# Patient Record
Sex: FEMALE | Race: BLACK OR AFRICAN AMERICAN
[De-identification: names, ages, dates, MRNs, and addresses within clinical notes are randomized per-mention and may not be internally consistent; named-entity substitution may affect disease eponyms.]

---

## 2018-04-18 ENCOUNTER — HOSPITAL ENCOUNTER (EMERGENCY)
Dept: HOSPITAL 19 - COL.ER | Age: 43
Discharge: HOME | End: 2018-04-18
Payer: MEDICARE

## 2018-04-18 VITALS — TEMPERATURE: 98 F | SYSTOLIC BLOOD PRESSURE: 118 MMHG | DIASTOLIC BLOOD PRESSURE: 69 MMHG

## 2018-04-18 VITALS — HEART RATE: 90 BPM

## 2018-04-18 VITALS — HEIGHT: 62.01 IN | BODY MASS INDEX: 41.86 KG/M2 | WEIGHT: 230.38 LBS

## 2018-04-18 DIAGNOSIS — Z79.82: ICD-10-CM

## 2018-04-18 DIAGNOSIS — Z98.890: ICD-10-CM

## 2018-04-18 DIAGNOSIS — F17.210: ICD-10-CM

## 2018-04-18 DIAGNOSIS — I25.2: ICD-10-CM

## 2018-04-18 DIAGNOSIS — Z95.5: ICD-10-CM

## 2018-04-18 DIAGNOSIS — Z98.51: ICD-10-CM

## 2018-04-18 DIAGNOSIS — I25.10: ICD-10-CM

## 2018-04-18 DIAGNOSIS — E11.9: ICD-10-CM

## 2018-04-18 DIAGNOSIS — S33.9XXA: Primary | ICD-10-CM

## 2018-04-18 DIAGNOSIS — J44.9: ICD-10-CM

## 2018-04-18 DIAGNOSIS — M46.1: ICD-10-CM

## 2018-04-18 DIAGNOSIS — Z79.02: ICD-10-CM

## 2018-04-18 DIAGNOSIS — I10: ICD-10-CM

## 2018-04-18 DIAGNOSIS — Z79.4: ICD-10-CM
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
## 2018-04-18 LAB
GLUCOSE UR QL STRIP.AUTO: (no result)
PH UR STRIP.AUTO: 5 [PH] (ref 5–8)
RBC # UR: (no result) /HPF
SP GR UR STRIP.AUTO: 1.01 (ref 1–1.03)
SQUAMOUS # URNS: (no result) /HPF
URN COLLECT METHOD CLASS: (no result)

## 2018-04-20 ENCOUNTER — HOSPITAL ENCOUNTER (EMERGENCY)
Dept: HOSPITAL 19 - COL.ER | Age: 43
Discharge: LEFT BEFORE DISCHARGE | End: 2018-04-20
Payer: MEDICARE

## 2018-04-20 VITALS — BODY MASS INDEX: 42.76 KG/M2 | WEIGHT: 250.45 LBS | HEIGHT: 64.02 IN

## 2018-04-20 VITALS — SYSTOLIC BLOOD PRESSURE: 155 MMHG | HEART RATE: 98 BPM | DIASTOLIC BLOOD PRESSURE: 77 MMHG | TEMPERATURE: 98.5 F

## 2018-04-20 DIAGNOSIS — R53.81: Primary | ICD-10-CM

## 2018-04-28 ENCOUNTER — HOSPITAL ENCOUNTER (EMERGENCY)
Dept: HOSPITAL 19 - COL.ER | Age: 43
LOS: 1 days | Discharge: HOME | End: 2018-04-29
Payer: MEDICARE

## 2018-04-28 VITALS — SYSTOLIC BLOOD PRESSURE: 153 MMHG | TEMPERATURE: 98.9 F | DIASTOLIC BLOOD PRESSURE: 80 MMHG

## 2018-04-28 VITALS — HEIGHT: 62.01 IN | BODY MASS INDEX: 41.86 KG/M2 | WEIGHT: 230.38 LBS

## 2018-04-28 DIAGNOSIS — I25.2: ICD-10-CM

## 2018-04-28 DIAGNOSIS — I25.10: ICD-10-CM

## 2018-04-28 DIAGNOSIS — J44.9: ICD-10-CM

## 2018-04-28 DIAGNOSIS — Z79.82: ICD-10-CM

## 2018-04-28 DIAGNOSIS — Z79.4: ICD-10-CM

## 2018-04-28 DIAGNOSIS — I11.0: ICD-10-CM

## 2018-04-28 DIAGNOSIS — E11.9: ICD-10-CM

## 2018-04-28 DIAGNOSIS — I50.9: ICD-10-CM

## 2018-04-28 DIAGNOSIS — R21: Primary | ICD-10-CM

## 2018-04-29 VITALS — HEART RATE: 100 BPM

## 2018-06-16 ENCOUNTER — HOSPITAL ENCOUNTER (EMERGENCY)
Dept: HOSPITAL 19 - COL.ER | Age: 43
Discharge: HOME | End: 2018-06-16
Payer: MEDICARE

## 2018-06-16 VITALS — WEIGHT: 215.39 LBS | HEIGHT: 60.98 IN | BODY MASS INDEX: 40.67 KG/M2

## 2018-06-16 VITALS — SYSTOLIC BLOOD PRESSURE: 131 MMHG | DIASTOLIC BLOOD PRESSURE: 78 MMHG | HEART RATE: 101 BPM

## 2018-06-16 VITALS — TEMPERATURE: 98.1 F

## 2018-06-16 DIAGNOSIS — Z79.82: ICD-10-CM

## 2018-06-16 DIAGNOSIS — I10: ICD-10-CM

## 2018-06-16 DIAGNOSIS — Z79.4: ICD-10-CM

## 2018-06-16 DIAGNOSIS — Z79.02: ICD-10-CM

## 2018-06-16 DIAGNOSIS — E11.21: Primary | ICD-10-CM

## 2018-06-16 DIAGNOSIS — J45.909: ICD-10-CM

## 2018-08-14 ENCOUNTER — HOSPITAL ENCOUNTER (EMERGENCY)
Dept: HOSPITAL 19 - COL.ER | Age: 43
Discharge: HOME | End: 2018-08-14
Payer: MEDICARE

## 2018-08-14 VITALS — TEMPERATURE: 97.3 F

## 2018-08-14 VITALS — HEART RATE: 90 BPM | DIASTOLIC BLOOD PRESSURE: 67 MMHG | SYSTOLIC BLOOD PRESSURE: 144 MMHG

## 2018-08-14 VITALS — WEIGHT: 220.46 LBS | HEIGHT: 62.01 IN | BODY MASS INDEX: 40.06 KG/M2

## 2018-08-14 DIAGNOSIS — E11.40: ICD-10-CM

## 2018-08-14 DIAGNOSIS — I25.2: ICD-10-CM

## 2018-08-14 DIAGNOSIS — Z79.02: ICD-10-CM

## 2018-08-14 DIAGNOSIS — R10.11: Primary | ICD-10-CM

## 2018-08-14 DIAGNOSIS — F17.210: ICD-10-CM

## 2018-08-14 DIAGNOSIS — I10: ICD-10-CM

## 2018-08-14 DIAGNOSIS — J44.9: ICD-10-CM

## 2018-08-14 DIAGNOSIS — Z95.5: ICD-10-CM

## 2018-08-14 DIAGNOSIS — Z79.4: ICD-10-CM

## 2018-08-14 LAB
ALBUMIN SERPL-MCNC: 3.8 GM/DL (ref 3.5–5)
ALP SERPL-CCNC: 42 U/L (ref 50–136)
ALT SERPL-CCNC: 28 U/L (ref 9–52)
ANION GAP SERPL CALC-SCNC: 11 MMOL/L (ref 7–16)
AST SERPL-CCNC: 29 U/L (ref 15–37)
BASOPHILS # BLD: 0 10*3/UL (ref 0–0.2)
BASOPHILS NFR BLD AUTO: 0.2 % (ref 0–2)
BILIRUB SERPL-MCNC: 0.4 MG/DL (ref 0–1)
BUN SERPL-MCNC: 5 MG/DL (ref 7–17)
CALCIUM SERPL-MCNC: 9 MG/DL (ref 8.4–10.2)
CHLORIDE SERPL-SCNC: 101 MMOL/L (ref 98–107)
CO2 SERPL-SCNC: 24 MMOL/L (ref 22–30)
CREAT SERPL-SCNC: 0.57 MG/DL (ref 0.52–1.25)
CRP SERPL-MCNC: 6.8 MG/DL (ref 0–0.9)
EOSINOPHIL # BLD: 0.1 10*3/UL (ref 0–0.7)
EOSINOPHIL NFR BLD: 0.7 % (ref 0–4)
ERYTHROCYTE [DISTWIDTH] IN BLOOD BY AUTOMATED COUNT: 14.7 % (ref 11.5–14.5)
GLUCOSE SERPL-MCNC: 294 MG/DL (ref 74–106)
GLUCOSE UR QL STRIP.AUTO: (no result)
GRANULOCYTES # BLD AUTO: 73.6 % (ref 42.2–75.2)
HCT VFR BLD AUTO: 33.9 % (ref 37–47)
HGB BLD-MCNC: 11.9 G/DL (ref 12.5–16)
LIPASE SERPL-CCNC: 25 U/L (ref 23–300)
LYMPHOCYTES # BLD: 2.3 10*3/UL (ref 1.2–3.4)
LYMPHOCYTES NFR BLD: 15.2 % (ref 20–51)
MCH RBC QN AUTO: 30 PG (ref 27–31)
MCHC RBC AUTO-ENTMCNC: 35 G/DL (ref 33–37)
MCV RBC AUTO: 86 FL (ref 80–100)
MONOCYTES # BLD: 1.5 10*3/UL (ref 0.1–0.6)
MONOCYTES NFR BLD AUTO: 9.9 % (ref 1.7–9.3)
NEUTROPHILS # BLD: 10.9 10*3/UL (ref 1.4–6.5)
PH UR STRIP.AUTO: 6 [PH] (ref 5–8)
PLATELET # BLD AUTO: 294 K/MM3 (ref 130–400)
PMV BLD AUTO: 10.6 FL (ref 7.4–10.4)
POTASSIUM SERPL-SCNC: 3.3 MMOL/L (ref 3.4–5)
PROT SERPL-MCNC: 7.3 GM/DL (ref 6.4–8.2)
RBC # BLD AUTO: 3.93 M/MM3 (ref 4.1–5.3)
RBC # UR: (no result) /HPF
SODIUM SERPL-SCNC: 136 MMOL/L (ref 137–145)
SP GR UR STRIP.AUTO: 1.02 (ref 1–1.03)
SQUAMOUS # URNS: (no result) /HPF
URN COLLECT METHOD CLASS: (no result)

## 2018-10-07 ENCOUNTER — HOSPITAL ENCOUNTER (EMERGENCY)
Dept: HOSPITAL 19 - COL.ER | Age: 43
Discharge: HOME | End: 2018-10-07
Payer: MEDICARE

## 2018-10-07 VITALS — BODY MASS INDEX: 36.41 KG/M2 | HEIGHT: 62.01 IN | WEIGHT: 200.4 LBS

## 2018-10-07 VITALS — TEMPERATURE: 98.1 F

## 2018-10-07 VITALS — HEART RATE: 98 BPM | SYSTOLIC BLOOD PRESSURE: 181 MMHG | DIASTOLIC BLOOD PRESSURE: 98 MMHG

## 2018-10-07 DIAGNOSIS — E11.9: ICD-10-CM

## 2018-10-07 DIAGNOSIS — Z79.4: ICD-10-CM

## 2018-10-07 DIAGNOSIS — I50.9: ICD-10-CM

## 2018-10-07 DIAGNOSIS — Z95.5: ICD-10-CM

## 2018-10-07 DIAGNOSIS — K59.00: Primary | ICD-10-CM

## 2018-10-07 DIAGNOSIS — Z79.82: ICD-10-CM

## 2018-12-26 ENCOUNTER — HOSPITAL ENCOUNTER (EMERGENCY)
Dept: HOSPITAL 19 - COL.ER | Age: 43
Discharge: HOME | End: 2018-12-26
Payer: MEDICARE

## 2018-12-26 VITALS — SYSTOLIC BLOOD PRESSURE: 151 MMHG | HEART RATE: 110 BPM | DIASTOLIC BLOOD PRESSURE: 104 MMHG

## 2018-12-26 VITALS — BODY MASS INDEX: 32.52 KG/M2 | HEIGHT: 64.02 IN | WEIGHT: 190.48 LBS

## 2018-12-26 VITALS — TEMPERATURE: 98 F

## 2018-12-26 DIAGNOSIS — E78.5: ICD-10-CM

## 2018-12-26 DIAGNOSIS — Z79.4: ICD-10-CM

## 2018-12-26 DIAGNOSIS — F17.210: ICD-10-CM

## 2018-12-26 DIAGNOSIS — I10: ICD-10-CM

## 2018-12-26 DIAGNOSIS — J44.9: ICD-10-CM

## 2018-12-26 DIAGNOSIS — E11.42: ICD-10-CM

## 2018-12-26 DIAGNOSIS — H16.002: Primary | ICD-10-CM

## 2018-12-26 DIAGNOSIS — Z79.82: ICD-10-CM

## 2019-03-03 ENCOUNTER — HOSPITAL ENCOUNTER (EMERGENCY)
Dept: HOSPITAL 19 - COL.ER | Age: 44
Discharge: HOME | End: 2019-03-03
Payer: MEDICARE

## 2019-03-03 VITALS — HEIGHT: 62.01 IN | BODY MASS INDEX: 36.41 KG/M2 | WEIGHT: 200.4 LBS

## 2019-03-03 VITALS — DIASTOLIC BLOOD PRESSURE: 121 MMHG | SYSTOLIC BLOOD PRESSURE: 183 MMHG | HEART RATE: 98 BPM

## 2019-03-03 VITALS — TEMPERATURE: 97.5 F

## 2019-03-03 DIAGNOSIS — Z79.4: ICD-10-CM

## 2019-03-03 DIAGNOSIS — F17.210: ICD-10-CM

## 2019-03-03 DIAGNOSIS — Z79.2: ICD-10-CM

## 2019-03-03 DIAGNOSIS — I10: Primary | ICD-10-CM

## 2019-03-03 DIAGNOSIS — R60.0: ICD-10-CM

## 2019-03-03 DIAGNOSIS — Z79.82: ICD-10-CM

## 2019-03-03 DIAGNOSIS — G89.29: ICD-10-CM

## 2019-03-03 DIAGNOSIS — E11.9: ICD-10-CM

## 2019-03-03 DIAGNOSIS — Z95.5: ICD-10-CM

## 2019-03-03 DIAGNOSIS — J44.9: ICD-10-CM

## 2019-03-03 DIAGNOSIS — I25.10: ICD-10-CM

## 2019-03-03 LAB
ALBUMIN SERPL-MCNC: 3.6 GM/DL (ref 3.5–5)
ALP SERPL-CCNC: 81 U/L (ref 50–136)
ALT SERPL-CCNC: 25 U/L (ref 9–52)
ANION GAP SERPL CALC-SCNC: 10 MMOL/L (ref 7–16)
AST SERPL-CCNC: 21 U/L (ref 15–37)
BASOPHILS # BLD: 0.1 10*3/UL (ref 0–0.2)
BASOPHILS NFR BLD AUTO: 0.4 % (ref 0–2)
BILIRUB SERPL-MCNC: 0.6 MG/DL (ref 0–1)
BUN SERPL-MCNC: 16 MG/DL (ref 7–17)
CALCIUM SERPL-MCNC: 9.4 MG/DL (ref 8.4–10.2)
CHLORIDE SERPL-SCNC: 98 MMOL/L (ref 98–107)
CO2 SERPL-SCNC: 29 MMOL/L (ref 22–30)
CREAT SERPL-SCNC: 0.57 MG/DL (ref 0.52–1.25)
EOSINOPHIL # BLD: 0.1 10*3/UL (ref 0–0.7)
EOSINOPHIL NFR BLD: 0.5 % (ref 0–4)
ERYTHROCYTE [DISTWIDTH] IN BLOOD BY AUTOMATED COUNT: 15.7 % (ref 11.5–14.5)
GLUCOSE SERPL-MCNC: 328 MG/DL (ref 74–106)
GRANULOCYTES # BLD AUTO: 67.8 % (ref 42.2–75.2)
HCT VFR BLD AUTO: 34.1 % (ref 37–47)
HGB BLD-MCNC: 11.4 G/DL (ref 12.5–16)
LYMPHOCYTES # BLD: 2.9 10*3/UL (ref 1.2–3.4)
LYMPHOCYTES NFR BLD: 25.3 % (ref 20–51)
MCH RBC QN AUTO: 27 PG (ref 27–31)
MCHC RBC AUTO-ENTMCNC: 33 G/DL (ref 33–37)
MCV RBC AUTO: 80 FL (ref 80–100)
MONOCYTES # BLD: 0.7 10*3/UL (ref 0.1–0.6)
MONOCYTES NFR BLD AUTO: 5.7 % (ref 1.7–9.3)
NEUTROPHILS # BLD: 7.7 10*3/UL (ref 1.4–6.5)
PLATELET # BLD AUTO: 313 K/MM3 (ref 130–400)
PMV BLD AUTO: 11.3 FL (ref 7.4–10.4)
POTASSIUM SERPL-SCNC: 3.3 MMOL/L (ref 3.4–5)
PROT SERPL-MCNC: 7.5 GM/DL (ref 6.4–8.2)
RBC # BLD AUTO: 4.26 M/MM3 (ref 4.1–5.3)
SODIUM SERPL-SCNC: 137 MMOL/L (ref 137–145)

## 2019-03-27 ENCOUNTER — HOSPITAL ENCOUNTER (EMERGENCY)
Dept: HOSPITAL 19 - COL.ER | Age: 44
Discharge: HOME | End: 2019-03-27
Payer: MEDICARE

## 2019-03-27 VITALS — HEART RATE: 103 BPM | DIASTOLIC BLOOD PRESSURE: 95 MMHG | SYSTOLIC BLOOD PRESSURE: 161 MMHG

## 2019-03-27 VITALS — TEMPERATURE: 98.5 F

## 2019-03-27 VITALS — HEIGHT: 62.01 IN | WEIGHT: 200.4 LBS | BODY MASS INDEX: 36.41 KG/M2

## 2019-03-27 DIAGNOSIS — Z98.51: ICD-10-CM

## 2019-03-27 DIAGNOSIS — Z79.4: ICD-10-CM

## 2019-03-27 DIAGNOSIS — E11.42: ICD-10-CM

## 2019-03-27 DIAGNOSIS — F17.210: ICD-10-CM

## 2019-03-27 DIAGNOSIS — Z79.02: ICD-10-CM

## 2019-03-27 DIAGNOSIS — R07.89: ICD-10-CM

## 2019-03-27 DIAGNOSIS — R60.9: Primary | ICD-10-CM

## 2019-03-27 DIAGNOSIS — Z98.890: ICD-10-CM

## 2019-03-27 DIAGNOSIS — F41.9: ICD-10-CM

## 2019-03-27 DIAGNOSIS — J45.909: ICD-10-CM

## 2019-03-27 DIAGNOSIS — I50.9: ICD-10-CM

## 2019-03-27 DIAGNOSIS — I25.10: ICD-10-CM

## 2019-03-27 DIAGNOSIS — Z79.82: ICD-10-CM

## 2019-03-27 LAB
ALBUMIN SERPL-MCNC: 3.8 GM/DL (ref 3.5–5)
ALP SERPL-CCNC: 81 U/L (ref 50–136)
ALT SERPL-CCNC: 6 U/L (ref 9–52)
ANION GAP SERPL CALC-SCNC: 12 MMOL/L (ref 7–16)
AST SERPL-CCNC: 19 U/L (ref 15–37)
BASOPHILS # BLD: 0 10*3/UL (ref 0–0.2)
BASOPHILS NFR BLD AUTO: 0.4 % (ref 0–2)
BILIRUB SERPL-MCNC: 0.4 MG/DL (ref 0–1)
BUN SERPL-MCNC: 11 MG/DL (ref 7–17)
CALCIUM SERPL-MCNC: 9.2 MG/DL (ref 8.4–10.2)
CHLORIDE SERPL-SCNC: 91 MMOL/L (ref 98–107)
CO2 SERPL-SCNC: 31 MMOL/L (ref 22–30)
CREAT SERPL-SCNC: 0.66 UMOL/L (ref 0.52–1.25)
EOSINOPHIL # BLD: 0.1 10*3/UL (ref 0–0.7)
EOSINOPHIL NFR BLD: 0.6 % (ref 0–4)
ERYTHROCYTE [DISTWIDTH] IN BLOOD BY AUTOMATED COUNT: 15 % (ref 11.5–14.5)
GLUCOSE SERPL-MCNC: 506 MG/DL (ref 74–106)
GRANULOCYTES # BLD AUTO: 69.5 % (ref 42.2–75.2)
HCT VFR BLD AUTO: 38.6 % (ref 37–47)
HGB BLD-MCNC: 13.3 G/DL (ref 12.5–16)
LYMPHOCYTES # BLD: 2.5 10*3/UL (ref 1.2–3.4)
LYMPHOCYTES NFR BLD: 22.9 % (ref 20–51)
MCH RBC QN AUTO: 27 PG (ref 27–31)
MCHC RBC AUTO-ENTMCNC: 35 G/DL (ref 33–37)
MCV RBC AUTO: 77 FL (ref 80–100)
MONOCYTES # BLD: 0.7 10*3/UL (ref 0.1–0.6)
MONOCYTES NFR BLD AUTO: 6.3 % (ref 1.7–9.3)
NEUTROPHILS # BLD: 7.5 10*3/UL (ref 1.4–6.5)
PLATELET # BLD AUTO: 239 K/MM3 (ref 130–400)
PMV BLD AUTO: 10.6 FL (ref 7.4–10.4)
POTASSIUM SERPL-SCNC: 2.8 MMOL/L (ref 3.4–5)
PROT SERPL-MCNC: 8 GM/DL (ref 6.4–8.2)
RBC # BLD AUTO: 5.02 M/MM3 (ref 4.1–5.3)
SODIUM SERPL-SCNC: 134 MMOL/L (ref 137–145)
TROPONIN I SERPL-MCNC: 0.01 NG/ML (ref 0–0.04)

## 2019-04-11 ENCOUNTER — HOSPITAL ENCOUNTER (OUTPATIENT)
Dept: HOSPITAL 19 - COL.RAD | Age: 44
End: 2019-04-11
Attending: PSYCHIATRY & NEUROLOGY
Payer: MEDICARE

## 2019-04-11 DIAGNOSIS — E11.42: Primary | ICD-10-CM

## 2019-04-11 DIAGNOSIS — M79.604: ICD-10-CM

## 2019-10-16 ENCOUNTER — HOSPITAL ENCOUNTER (OUTPATIENT)
Dept: HOSPITAL 19 - COL.CAR | Age: 44
LOS: 1 days | Discharge: HOME | End: 2019-10-17
Attending: INTERNAL MEDICINE
Payer: MEDICARE

## 2019-10-16 VITALS — OXYGEN SATURATION: 99 %

## 2019-10-16 VITALS — OXYGEN SATURATION: 97 %

## 2019-10-16 VITALS — OXYGEN SATURATION: 100 %

## 2019-10-16 VITALS — HEIGHT: 62.01 IN | WEIGHT: 215.83 LBS | BODY MASS INDEX: 39.22 KG/M2

## 2019-10-16 VITALS — DIASTOLIC BLOOD PRESSURE: 103 MMHG | HEART RATE: 105 BPM | SYSTOLIC BLOOD PRESSURE: 141 MMHG

## 2019-10-16 VITALS — OXYGEN SATURATION: 96 %

## 2019-10-16 VITALS — OXYGEN SATURATION: 98 %

## 2019-10-16 VITALS — OXYGEN SATURATION: 95 %

## 2019-10-16 VITALS — SYSTOLIC BLOOD PRESSURE: 138 MMHG | OXYGEN SATURATION: 98 % | DIASTOLIC BLOOD PRESSURE: 104 MMHG | HEART RATE: 91 BPM

## 2019-10-16 VITALS
OXYGEN SATURATION: 95 % | SYSTOLIC BLOOD PRESSURE: 141 MMHG | TEMPERATURE: 97.9 F | DIASTOLIC BLOOD PRESSURE: 95 MMHG | HEART RATE: 91 BPM

## 2019-10-16 VITALS — HEART RATE: 89 BPM | OXYGEN SATURATION: 100 %

## 2019-10-16 VITALS — OXYGEN SATURATION: 89 %

## 2019-10-16 VITALS — HEART RATE: 110 BPM | SYSTOLIC BLOOD PRESSURE: 133 MMHG | DIASTOLIC BLOOD PRESSURE: 79 MMHG | TEMPERATURE: 98 F

## 2019-10-16 VITALS — OXYGEN SATURATION: 99 % | TEMPERATURE: 98 F | HEART RATE: 108 BPM

## 2019-10-16 VITALS — HEART RATE: 91 BPM | OXYGEN SATURATION: 98 %

## 2019-10-16 VITALS — TEMPERATURE: 98.1 F | OXYGEN SATURATION: 100 % | HEART RATE: 109 BPM

## 2019-10-16 VITALS — HEART RATE: 104 BPM | DIASTOLIC BLOOD PRESSURE: 99 MMHG | TEMPERATURE: 98 F | SYSTOLIC BLOOD PRESSURE: 128 MMHG

## 2019-10-16 VITALS — TEMPERATURE: 98 F | HEART RATE: 109 BPM

## 2019-10-16 VITALS — OXYGEN SATURATION: 80 %

## 2019-10-16 VITALS — OXYGEN SATURATION: 100 % | HEART RATE: 107 BPM | TEMPERATURE: 98.1 F

## 2019-10-16 VITALS — SYSTOLIC BLOOD PRESSURE: 121 MMHG | DIASTOLIC BLOOD PRESSURE: 78 MMHG | OXYGEN SATURATION: 97 % | HEART RATE: 90 BPM

## 2019-10-16 VITALS — OXYGEN SATURATION: 87 %

## 2019-10-16 VITALS
HEART RATE: 109 BPM | TEMPERATURE: 98 F | SYSTOLIC BLOOD PRESSURE: 148 MMHG | DIASTOLIC BLOOD PRESSURE: 107 MMHG | OXYGEN SATURATION: 96 %

## 2019-10-16 VITALS — OXYGEN SATURATION: 62 %

## 2019-10-16 VITALS — OXYGEN SATURATION: 93 %

## 2019-10-16 VITALS — OXYGEN SATURATION: 100 % | HEART RATE: 92 BPM

## 2019-10-16 VITALS — OXYGEN SATURATION: 92 %

## 2019-10-16 VITALS — OXYGEN SATURATION: 90 %

## 2019-10-16 DIAGNOSIS — I73.9: ICD-10-CM

## 2019-10-16 DIAGNOSIS — I10: ICD-10-CM

## 2019-10-16 DIAGNOSIS — E11.9: ICD-10-CM

## 2019-10-16 DIAGNOSIS — J44.9: ICD-10-CM

## 2019-10-16 DIAGNOSIS — I25.118: Primary | ICD-10-CM

## 2019-10-16 DIAGNOSIS — Z86.73: ICD-10-CM

## 2019-10-16 DIAGNOSIS — I25.10: ICD-10-CM

## 2019-10-16 DIAGNOSIS — F17.210: ICD-10-CM

## 2019-10-16 DIAGNOSIS — Z98.61: ICD-10-CM

## 2019-10-16 DIAGNOSIS — G47.33: ICD-10-CM

## 2019-10-16 DIAGNOSIS — E78.5: ICD-10-CM

## 2019-10-16 DIAGNOSIS — I25.9: ICD-10-CM

## 2019-10-16 DIAGNOSIS — Z79.82: ICD-10-CM

## 2019-10-16 LAB
ANION GAP SERPL CALC-SCNC: 9 MMOL/L (ref 7–16)
BUN SERPL-MCNC: 16 MG/DL (ref 7–17)
CALCIUM SERPL-MCNC: 9.3 MG/DL (ref 8.4–10.2)
CHLORIDE SERPL-SCNC: 101 MMOL/L (ref 98–107)
CO2 SERPL-SCNC: 28 MMOL/L (ref 22–30)
CREAT SERPL-SCNC: 0.76 UMOL/L (ref 0.52–1.25)
ERYTHROCYTE [DISTWIDTH] IN BLOOD BY AUTOMATED COUNT: 14.8 % (ref 11.5–14.5)
GLUCOSE SERPL-MCNC: 208 MG/DL (ref 74–106)
HCT VFR BLD AUTO: 29.7 % (ref 37–47)
HGB BLD-MCNC: 9.9 G/DL (ref 12.5–16)
INR BLD: 1.1 (ref 0.8–3)
MCH RBC QN AUTO: 28 PG (ref 27–31)
MCHC RBC AUTO-ENTMCNC: 33 G/DL (ref 33–37)
MCV RBC AUTO: 83 FL (ref 80–100)
PLATELET # BLD AUTO: 302 K/MM3 (ref 130–400)
PMV BLD AUTO: 10.2 FL (ref 7.4–10.4)
POTASSIUM SERPL-SCNC: 3.1 MMOL/L (ref 3.4–5)
PROTHROMBIN TIME: 13.4 SECONDS (ref 9.7–12.8)
RBC # BLD AUTO: 3.58 M/MM3 (ref 4.1–5.3)
SODIUM SERPL-SCNC: 138 MMOL/L (ref 137–145)

## 2019-10-16 PROCEDURE — C1874 STENT, COATED/COV W/DEL SYS: HCPCS

## 2019-10-16 PROCEDURE — C1725 CATH, TRANSLUMIN NON-LASER: HCPCS

## 2019-10-16 PROCEDURE — C1894 INTRO/SHEATH, NON-LASER: HCPCS

## 2019-10-16 PROCEDURE — C9600 PERC DRUG-EL COR STENT SING: HCPCS

## 2019-10-16 PROCEDURE — C1769 GUIDE WIRE: HCPCS

## 2019-10-16 PROCEDURE — C1887 CATHETER, GUIDING: HCPCS

## 2019-10-17 VITALS — OXYGEN SATURATION: 95 %

## 2019-10-17 VITALS — OXYGEN SATURATION: 100 %

## 2019-10-17 VITALS — OXYGEN SATURATION: 92 %

## 2019-10-17 VITALS — OXYGEN SATURATION: 97 %

## 2019-10-17 VITALS — OXYGEN SATURATION: 89 %

## 2019-10-17 VITALS — OXYGEN SATURATION: 96 %

## 2019-10-17 VITALS — OXYGEN SATURATION: 90 %

## 2019-10-17 VITALS — OXYGEN SATURATION: 99 %

## 2019-10-17 VITALS — OXYGEN SATURATION: 94 %

## 2019-10-17 VITALS — OXYGEN SATURATION: 91 %

## 2019-10-17 VITALS — TEMPERATURE: 97.8 F | DIASTOLIC BLOOD PRESSURE: 87 MMHG | HEART RATE: 91 BPM | SYSTOLIC BLOOD PRESSURE: 116 MMHG

## 2019-10-17 VITALS — OXYGEN SATURATION: 98 %

## 2019-10-17 VITALS — OXYGEN SATURATION: 93 %

## 2019-10-17 VITALS
HEART RATE: 91 BPM | OXYGEN SATURATION: 100 % | TEMPERATURE: 98.5 F | SYSTOLIC BLOOD PRESSURE: 144 MMHG | DIASTOLIC BLOOD PRESSURE: 104 MMHG

## 2019-10-17 VITALS — OXYGEN SATURATION: 67 %

## 2019-10-17 VITALS — DIASTOLIC BLOOD PRESSURE: 84 MMHG | SYSTOLIC BLOOD PRESSURE: 133 MMHG | OXYGEN SATURATION: 94 % | HEART RATE: 94 BPM

## 2019-10-17 VITALS — OXYGEN SATURATION: 88 %

## 2019-10-17 VITALS — OXYGEN SATURATION: 84 %

## 2019-10-17 VITALS
DIASTOLIC BLOOD PRESSURE: 87 MMHG | HEART RATE: 92 BPM | TEMPERATURE: 98.6 F | OXYGEN SATURATION: 96 % | SYSTOLIC BLOOD PRESSURE: 138 MMHG

## 2019-10-17 VITALS — OXYGEN SATURATION: 85 %

## 2019-10-17 VITALS — OXYGEN SATURATION: 83 %

## 2019-10-17 VITALS — OXYGEN SATURATION: 86 %

## 2019-10-17 VITALS — OXYGEN SATURATION: 87 %

## 2019-10-17 VITALS — OXYGEN SATURATION: 80 %

## 2019-10-17 VITALS — OXYGEN SATURATION: 82 %

## 2020-01-24 ENCOUNTER — HOSPITAL ENCOUNTER (EMERGENCY)
Dept: HOSPITAL 19 - COL.ER | Age: 45
Discharge: LEFT BEFORE DISCHARGE | End: 2020-01-24
Payer: MEDICARE

## 2020-01-24 VITALS — BODY MASS INDEX: 40.06 KG/M2 | HEIGHT: 62.01 IN | WEIGHT: 220.46 LBS

## 2020-01-24 VITALS — HEART RATE: 106 BPM | DIASTOLIC BLOOD PRESSURE: 107 MMHG | TEMPERATURE: 97.6 F | SYSTOLIC BLOOD PRESSURE: 174 MMHG

## 2020-01-24 DIAGNOSIS — I11.0: ICD-10-CM

## 2020-01-24 DIAGNOSIS — Z79.84: ICD-10-CM

## 2020-01-24 DIAGNOSIS — Z79.82: ICD-10-CM

## 2020-01-24 DIAGNOSIS — I50.9: ICD-10-CM

## 2020-01-24 DIAGNOSIS — E78.5: ICD-10-CM

## 2020-01-24 DIAGNOSIS — R06.02: Primary | ICD-10-CM

## 2020-01-24 DIAGNOSIS — E11.40: ICD-10-CM

## 2020-01-24 DIAGNOSIS — J44.9: ICD-10-CM

## 2020-02-15 ENCOUNTER — HOSPITAL ENCOUNTER (EMERGENCY)
Dept: HOSPITAL 19 - COL.ER | Age: 45
Discharge: HOME | End: 2020-02-15
Payer: MEDICARE

## 2020-02-15 VITALS — DIASTOLIC BLOOD PRESSURE: 100 MMHG | TEMPERATURE: 97.4 F | SYSTOLIC BLOOD PRESSURE: 145 MMHG

## 2020-02-15 VITALS — BODY MASS INDEX: 41.86 KG/M2 | HEIGHT: 62.01 IN | WEIGHT: 230.38 LBS

## 2020-02-15 VITALS — HEART RATE: 99 BPM

## 2020-02-15 DIAGNOSIS — F17.210: ICD-10-CM

## 2020-02-15 DIAGNOSIS — Z79.84: ICD-10-CM

## 2020-02-15 DIAGNOSIS — I25.10: ICD-10-CM

## 2020-02-15 DIAGNOSIS — I10: ICD-10-CM

## 2020-02-15 DIAGNOSIS — Z79.82: ICD-10-CM

## 2020-02-15 DIAGNOSIS — E11.40: ICD-10-CM

## 2020-02-15 DIAGNOSIS — L03.311: Primary | ICD-10-CM

## 2020-02-15 DIAGNOSIS — Z98.51: ICD-10-CM

## 2021-01-23 ENCOUNTER — HOSPITAL ENCOUNTER (EMERGENCY)
Dept: HOSPITAL 19 - COL.ER | Age: 46
Discharge: HOME | End: 2021-01-23
Attending: EMERGENCY MEDICINE
Payer: MEDICARE

## 2021-01-23 VITALS — TEMPERATURE: 97.7 F | HEART RATE: 88 BPM | DIASTOLIC BLOOD PRESSURE: 84 MMHG | SYSTOLIC BLOOD PRESSURE: 148 MMHG

## 2021-01-23 VITALS — BODY MASS INDEX: 36.41 KG/M2 | HEIGHT: 62.01 IN | WEIGHT: 200.4 LBS

## 2021-01-23 DIAGNOSIS — L02.219: Primary | ICD-10-CM

## 2021-01-23 DIAGNOSIS — F17.210: ICD-10-CM

## 2021-01-23 DIAGNOSIS — Z79.02: ICD-10-CM

## 2021-01-23 DIAGNOSIS — Z79.82: ICD-10-CM

## 2021-01-23 DIAGNOSIS — I10: ICD-10-CM

## 2021-01-23 DIAGNOSIS — E11.65: ICD-10-CM

## 2021-01-23 DIAGNOSIS — Z95.9: ICD-10-CM

## 2021-01-23 DIAGNOSIS — I25.10: ICD-10-CM

## 2021-01-23 DIAGNOSIS — J44.9: ICD-10-CM

## 2021-01-23 DIAGNOSIS — Z79.84: ICD-10-CM

## 2021-01-23 LAB
ALBUMIN SERPL-MCNC: 3.5 GM/DL (ref 3.5–5)
ALP SERPL-CCNC: 149 U/L (ref 50–136)
ALT SERPL-CCNC: 19 U/L (ref 4–34)
ANION GAP SERPL CALC-SCNC: 10 MMOL/L (ref 7–16)
ANISOCYTOSIS BLD QL: (no result)
AST SERPL-CCNC: 20 U/L (ref 15–37)
BILIRUB SERPL-MCNC: 2.2 MG/DL (ref 0–1)
BUN SERPL-MCNC: 26 MG/DL (ref 7–17)
CALCIUM SERPL-MCNC: 9.6 MG/DL (ref 8.4–10.2)
CHLORIDE SERPL-SCNC: 101 MMOL/L (ref 98–107)
CO2 SERPL-SCNC: 24 MMOL/L (ref 22–30)
CREAT SERPL-SCNC: 0.93 UMOL/L (ref 0.52–1.25)
ERYTHROCYTE [DISTWIDTH] IN BLOOD BY AUTOMATED COUNT: 17.4 % (ref 11.5–14.5)
GLUCOSE SERPL-MCNC: 271 MG/DL (ref 74–106)
HCT VFR BLD AUTO: 31.1 % (ref 37–47)
HGB BLD-MCNC: 10.4 G/DL (ref 12.5–16)
LYMPHOCYTES NFR BLD MANUAL: 16 % (ref 20–51)
MCH RBC QN AUTO: 29 PG (ref 27–31)
MCHC RBC AUTO-ENTMCNC: 33 G/DL (ref 33–37)
MCV RBC AUTO: 86 FL (ref 80–100)
METAMYELOCYTES NFR BLD MANUAL: 1 % (ref 0–0)
MONOCYTES NFR BLD: 6 % (ref 1.7–9.3)
NEUTS SEG NFR BLD MANUAL: 77 % (ref 42–75.2)
PLATELET # BLD AUTO: 341 K/MM3 (ref 130–400)
PLATELET BLD QL SMEAR: NORMAL
PMV BLD AUTO: 12.3 FL (ref 7.4–10.4)
POTASSIUM SERPL-SCNC: 4.8 MMOL/L (ref 3.4–5)
PROT SERPL-MCNC: 8 GM/DL (ref 6.4–8.2)
RBC # BLD AUTO: 3.62 M/MM3 (ref 4.1–5.3)
SODIUM SERPL-SCNC: 135 MMOL/L (ref 137–145)
TARGETS BLD QL SMEAR: (no result)

## 2021-01-25 LAB — PATH REV BLD -IMP: (no result)

## 2021-02-17 ENCOUNTER — HOSPITAL ENCOUNTER (OUTPATIENT)
Dept: HOSPITAL 19 - COL.ER | Age: 46
Setting detail: OBSERVATION
LOS: 2 days | Discharge: HOME | End: 2021-02-19
Attending: INTERNAL MEDICINE | Admitting: INTERNAL MEDICINE
Payer: MEDICARE

## 2021-02-17 VITALS — BODY MASS INDEX: 35.49 KG/M2 | HEIGHT: 62.01 IN | WEIGHT: 195.33 LBS

## 2021-02-17 VITALS — SYSTOLIC BLOOD PRESSURE: 140 MMHG | DIASTOLIC BLOOD PRESSURE: 78 MMHG | HEART RATE: 89 BPM

## 2021-02-17 VITALS — SYSTOLIC BLOOD PRESSURE: 95 MMHG | HEART RATE: 69 BPM | TEMPERATURE: 97.4 F | DIASTOLIC BLOOD PRESSURE: 70 MMHG

## 2021-02-17 DIAGNOSIS — I50.22: ICD-10-CM

## 2021-02-17 DIAGNOSIS — M79.661: ICD-10-CM

## 2021-02-17 DIAGNOSIS — L98.429: ICD-10-CM

## 2021-02-17 DIAGNOSIS — J44.9: ICD-10-CM

## 2021-02-17 DIAGNOSIS — M79.662: ICD-10-CM

## 2021-02-17 DIAGNOSIS — D64.9: ICD-10-CM

## 2021-02-17 DIAGNOSIS — F32.9: ICD-10-CM

## 2021-02-17 DIAGNOSIS — E11.51: ICD-10-CM

## 2021-02-17 DIAGNOSIS — E11.622: Primary | ICD-10-CM

## 2021-02-17 DIAGNOSIS — G47.33: ICD-10-CM

## 2021-02-17 DIAGNOSIS — G47.00: ICD-10-CM

## 2021-02-17 DIAGNOSIS — F17.210: ICD-10-CM

## 2021-02-17 DIAGNOSIS — I11.0: ICD-10-CM

## 2021-02-17 DIAGNOSIS — E11.40: ICD-10-CM

## 2021-02-17 DIAGNOSIS — Z95.5: ICD-10-CM

## 2021-02-17 DIAGNOSIS — Z79.84: ICD-10-CM

## 2021-02-17 DIAGNOSIS — I25.10: ICD-10-CM

## 2021-02-17 DIAGNOSIS — I25.2: ICD-10-CM

## 2021-02-17 DIAGNOSIS — Z79.899: ICD-10-CM

## 2021-02-17 DIAGNOSIS — Z79.82: ICD-10-CM

## 2021-02-17 DIAGNOSIS — F41.9: ICD-10-CM

## 2021-02-17 DIAGNOSIS — B95.62: ICD-10-CM

## 2021-02-17 LAB
ALBUMIN SERPL-MCNC: 3.5 GM/DL (ref 3.5–5)
ALP SERPL-CCNC: 108 U/L (ref 50–136)
ALT SERPL-CCNC: 16 U/L (ref 4–34)
ANION GAP SERPL CALC-SCNC: 11 MMOL/L (ref 7–16)
APTT PPP: 27.8 SECONDS (ref 26–37)
AST SERPL-CCNC: 28 U/L (ref 15–37)
BASOPHILS # BLD: 0 10*3/UL (ref 0–0.2)
BASOPHILS NFR BLD AUTO: 0.5 % (ref 0–2)
BILIRUB SERPL-MCNC: 1.7 MG/DL (ref 0–1)
BUN SERPL-MCNC: 15 MG/DL (ref 7–17)
CALCIUM SERPL-MCNC: 9.3 MG/DL (ref 8.4–10.2)
CHLORIDE SERPL-SCNC: 105 MMOL/L (ref 98–107)
CO2 SERPL-SCNC: 24 MMOL/L (ref 22–30)
CREAT SERPL-SCNC: 1.09 UMOL/L (ref 0.52–1.25)
DEPRECATED D DIMER PPP IA-ACNC: 470 NG/MLDDU (ref 200–230)
EOSINOPHIL # BLD: 0.1 10*3/UL (ref 0–0.7)
EOSINOPHIL NFR BLD: 1 % (ref 0–4)
ERYTHROCYTE [DISTWIDTH] IN BLOOD BY AUTOMATED COUNT: 17.4 % (ref 11.5–14.5)
GLUCOSE SERPL-MCNC: 167 MG/DL (ref 74–106)
GRANULOCYTES # BLD AUTO: 72 % (ref 42.2–75.2)
HCT VFR BLD AUTO: 33.4 % (ref 37–47)
HGB BLD-MCNC: 10.8 G/DL (ref 12.5–16)
INR BLD: 1.5 (ref 0.8–3)
LIPASE SERPL-CCNC: 53 U/L (ref 23–300)
LYMPHOCYTES # BLD: 1.5 10*3/UL (ref 1.2–3.4)
LYMPHOCYTES NFR BLD: 17.2 % (ref 20–51)
MCH RBC QN AUTO: 28 PG (ref 27–31)
MCHC RBC AUTO-ENTMCNC: 32 G/DL (ref 33–37)
MCV RBC AUTO: 87 FL (ref 80–100)
MONOCYTES # BLD: 0.8 10*3/UL (ref 0.1–0.6)
MONOCYTES NFR BLD AUTO: 8.9 % (ref 1.7–9.3)
NEUTROPHILS # BLD: 6.1 10*3/UL (ref 1.4–6.5)
PLATELET # BLD AUTO: 275 K/MM3 (ref 130–400)
PMV BLD AUTO: 11.5 FL (ref 7.4–10.4)
POTASSIUM SERPL-SCNC: 3.7 MMOL/L (ref 3.4–5)
PROT SERPL-MCNC: 7.6 GM/DL (ref 6.4–8.2)
PROTHROMBIN TIME: 17.1 SECONDS (ref 9.7–12.8)
RBC # BLD AUTO: 3.82 M/MM3 (ref 4.1–5.3)
SODIUM SERPL-SCNC: 141 MMOL/L (ref 137–145)
TROPONIN I SERPL-MCNC: < 0.012 NG/ML (ref 0–0.04)

## 2021-02-17 PROCEDURE — G0378 HOSPITAL OBSERVATION PER HR: HCPCS

## 2021-02-17 NOTE — NUR
Pt up to room 318, report taken on phone from ER nurse. Pt up to room during
bedside report w/ night shift, report given to CRISTIN Sosa

## 2021-02-17 NOTE — NUR
Patient complaining of level 10 pain to right leg and neck. Given PRN Morphine
for pain at this time.

## 2021-02-17 NOTE — NUR
Patient assessed at this time. Alert and oriented, and able to make needs
known. Reported elvel 9 paint to right foot. Given PRN Oxycodone as ordered.
INT to left AC flushed. Site without redness, warmth, swelling, and pain.
Denies having SOB and dsypnea. LS CTA. Respirations even and unlabored. HRR.
Telemetry in place. Capillary refill less than 3 seconds. Non-tenting skin
turgor. BSAx4. Abdomen soft and non-tender. Edema/redness to BLE. Dressing to
spider bite changed. Cleaned out area. Yellow drainage. Wet-to-dry dressing
applied for tonight. Pustules all over back, on arms, and legs. No
opened/draining sites noted. Sores to right foot without any drainage. Stated
that she had taken a needle to right big toe to "drain pus" at home. Oriented
patient and daughter to room. Voices no questions, needs, or concerns at this
time. Resting in bed with call light within reach.

## 2021-02-18 VITALS — DIASTOLIC BLOOD PRESSURE: 53 MMHG | HEART RATE: 54 BPM | SYSTOLIC BLOOD PRESSURE: 85 MMHG

## 2021-02-18 VITALS — HEART RATE: 64 BPM | SYSTOLIC BLOOD PRESSURE: 91 MMHG | TEMPERATURE: 98.1 F | DIASTOLIC BLOOD PRESSURE: 74 MMHG

## 2021-02-18 VITALS — HEART RATE: 68 BPM | DIASTOLIC BLOOD PRESSURE: 66 MMHG | SYSTOLIC BLOOD PRESSURE: 112 MMHG | TEMPERATURE: 97.5 F

## 2021-02-18 VITALS — SYSTOLIC BLOOD PRESSURE: 102 MMHG | DIASTOLIC BLOOD PRESSURE: 68 MMHG | HEART RATE: 68 BPM | TEMPERATURE: 98.3 F

## 2021-02-18 VITALS — HEART RATE: 69 BPM | TEMPERATURE: 97.9 F | SYSTOLIC BLOOD PRESSURE: 101 MMHG | DIASTOLIC BLOOD PRESSURE: 64 MMHG

## 2021-02-18 VITALS — HEART RATE: 62 BPM | DIASTOLIC BLOOD PRESSURE: 74 MMHG | SYSTOLIC BLOOD PRESSURE: 98 MMHG

## 2021-02-18 LAB
ALBUMIN SERPL-MCNC: 2.9 GM/DL (ref 3.5–5)
ALP SERPL-CCNC: 77 U/L (ref 50–136)
ALT SERPL-CCNC: 12 U/L (ref 4–34)
ANION GAP SERPL CALC-SCNC: 5 MMOL/L (ref 7–16)
AST SERPL-CCNC: 21 U/L (ref 15–37)
BASE EXCESS BLDA CALC-SCNC: 0.8 MMOL/L (ref -2–2)
BASOPHILS # BLD: 0 10*3/UL (ref 0–0.2)
BASOPHILS NFR BLD AUTO: 0.4 % (ref 0–2)
BILIRUB SERPL-MCNC: 1.4 MG/DL (ref 0–1)
BUN SERPL-MCNC: 16 MG/DL (ref 7–17)
CALCIUM SERPL-MCNC: 8.4 MG/DL (ref 8.4–10.2)
CHLORIDE SERPL-SCNC: 110 MMOL/L (ref 98–107)
CO2 BLDA-SCNC: 28.6 MMOL/L
CO2 SERPL-SCNC: 25 MMOL/L (ref 22–30)
CREAT SERPL-SCNC: 1.11 UMOL/L (ref 0.52–1.25)
CRP SERPL-MCNC: 1.6 MG/DL (ref 0–0.9)
EOSINOPHIL # BLD: 0 10*3/UL (ref 0–0.7)
EOSINOPHIL NFR BLD: 0.6 % (ref 0–4)
ERYTHROCYTE [DISTWIDTH] IN BLOOD BY AUTOMATED COUNT: 17.6 % (ref 11.5–14.5)
GLUCOSE SERPL-MCNC: 109 MG/DL (ref 74–106)
GRANULOCYTES # BLD AUTO: 63.5 % (ref 42.2–75.2)
HCO3 BLDA-SCNC: 27 MEQ/L (ref 22–26)
HCT VFR BLD AUTO: 29.9 % (ref 37–47)
HGB BLD-MCNC: 9.5 G/DL (ref 12.5–16)
LYMPHOCYTES # BLD: 1.3 10*3/UL (ref 1.2–3.4)
LYMPHOCYTES NFR BLD: 26.1 % (ref 20–51)
MCH RBC QN AUTO: 28 PG (ref 27–31)
MCHC RBC AUTO-ENTMCNC: 32 G/DL (ref 33–37)
MCV RBC AUTO: 89 FL (ref 80–100)
MONOCYTES # BLD: 0.5 10*3/UL (ref 0.1–0.6)
MONOCYTES NFR BLD AUTO: 9 % (ref 1.7–9.3)
NEUTROPHILS # BLD: 3.2 10*3/UL (ref 1.4–6.5)
PCO2 BLDA: 50.3 MMHG (ref 35–45)
PLATELET # BLD AUTO: 224 K/MM3 (ref 130–400)
PMV BLD AUTO: 11.4 FL (ref 7.4–10.4)
PO2 BLDA: 96.5 MMHG (ref 80–100)
POTASSIUM SERPL-SCNC: 3.6 MMOL/L (ref 3.4–5)
PROT SERPL-MCNC: 6.2 GM/DL (ref 6.4–8.2)
RBC # BLD AUTO: 3.38 M/MM3 (ref 4.1–5.3)
SAO2 % BLDA: 97 % (ref 92–100)
SODIUM SERPL-SCNC: 140 MMOL/L (ref 137–145)

## 2021-02-18 NOTE — NUR
Pt sleeping upon entry to room around 1730. Pt looked to need situating in
bed. Pt laying crooked. This nurse in to give pt 1700 meds. Pt awakened,
needed to get situated in bed, stated she "needed a minute", speech mumbled
upon waking up. Pt asking about percocet. Once pt was sitting up she was
burping and stated she felt like she needed to throw up, provided w/ basin and
proceeed to throw up twice, tan to green liquid, no blood seen. This nurse
BJORN carvalho zofran ordered and administered per mar. Pt was sleeping
upon entry. No PO meds given for 1700. Report given to CRISTIN pineda.

## 2021-02-18 NOTE — NUR
New lab orders placed. I discussed w/ patient about importance of obtaining
labs to continue w/ goals of care. pt agreed to labs. Pt placed on MRSA
contact precautions.

## 2021-02-18 NOTE — NUR
Pt 02 found to be 83% upon assessment. Lethargic and mininmal reponse to
verbal commands. Will wake up briefly but goes right back to sleep. Neuro
intact. 2L N/C brings it up 98%. RT called for assess. Elie MILAN  notifed of
pt status. She assessed and placed orders. Oral meds on hold, pt NPO. CT head
and chest xray ordered,drug screen  and abg.
Report pt did have episode of vomiting during day
shift. Monitoring autumn and will update elie as test come in.

## 2021-02-18 NOTE — NUR
Patient has received PRN pain medications as requested during the night. Has
been NPO since midnight per orders for possible I&D. Voices no questions,
needs, or concerns at this time. Resting in bed with call light within reach.

## 2021-02-18 NOTE — NUR
Lab informed this nurse that patient was refusing blood cultures this
morning. This nurse in to see patient. Pt having vitals taken at this time w/
CNA. Pt has mumbled speech, half asleep, difficult to understand. Pt refusing
to have lab cultures this morning, stating "its too early for all this".
Discussed w/ patient goals of care, pt refusing this morning. Will inform
hospitalist.

## 2021-02-18 NOTE — NUR
attempted to meet with patient to complete initial intake
however patient was sleeping. Patient's daughter, Deisi (ph#[phone]) is
at bedside and answered intake questions. Patient lives in [place] at
the [facility]. Deisi advised patient has lived at the mot for a
long time. Deisi believes patient either obtains her medications from [facility] or
[facility]. Patient has a walker and CPAP at home. Deisi advised patient was
independent with ADLS until she had the spider bite which has caused some
mobility issues. DARRICK reviewed OT recommendation for post acute rehab and Deisi
states patient will likely not be agreeable to rehab, but would possibly be
open to Home Health services. Deisi states they would be interested in SW
contacting [facility]. Deisi reports obtaining HH in the past has been a
challenge as patient lives in a motel. DARRICK inquired about Advance Directives
and Deisi advised patient completed them previously when she was at [facility]. DARRICK contacted Radha at [facility] to provide information verbally about
referral and faxed clinical information. DARRICK also contacted the records
department at [facility] and obtained copy of patient's DPOA-HC. Patient's
daughter, Deisi is designated. DARRICK placed a copy of this paperwork on
patient's chart. DARRICK will continue to follow.

## 2021-02-19 VITALS — TEMPERATURE: 98.7 F | HEART RATE: 78 BPM | SYSTOLIC BLOOD PRESSURE: 114 MMHG | DIASTOLIC BLOOD PRESSURE: 96 MMHG

## 2021-02-19 VITALS — DIASTOLIC BLOOD PRESSURE: 58 MMHG | TEMPERATURE: 97.3 F | SYSTOLIC BLOOD PRESSURE: 116 MMHG | HEART RATE: 73 BPM

## 2021-02-19 VITALS — SYSTOLIC BLOOD PRESSURE: 117 MMHG | DIASTOLIC BLOOD PRESSURE: 74 MMHG | HEART RATE: 74 BPM | TEMPERATURE: 98.7 F

## 2021-02-19 VITALS — SYSTOLIC BLOOD PRESSURE: 100 MMHG | HEART RATE: 70 BPM | TEMPERATURE: 98.1 F | DIASTOLIC BLOOD PRESSURE: 58 MMHG

## 2021-02-19 LAB
DRUGS UR SCN NOM: POSITIVE NG/ML
PH UR STRIP.AUTO: 5 [PH] (ref 5–8)
RBC # UR: (no result) /HPF
SP GR UR STRIP.AUTO: 1.02 (ref 1–1.03)
SQUAMOUS # URNS: (no result) /HPF
UA DIPSTICK PNL UR STRIP.AUTO: (no result)
URN COLLECT METHOD CLASS: (no result)
UROBILINOGEN UR STRIP.AUTO-MCNC: >=4 MG/DL

## 2021-02-19 NOTE — NUR
PT AWAKE AND ALERTX4 NOW. SITTING UP IN BED ASKING FOR LIQUIDS. SIP OF WATER
GIVEN WITHOUT DIFFICULTY. PER FELA PA MAY GIVE CLEARS LIQ AT RN DESCRETION
WHEN FEELS MAY CONSUME SAFLEY.

## 2021-02-19 NOTE — NUR
Pt is very grogy with rounds, wakes up long enough to ask for pain meds and
goes back to right back to sleep.
I did give her a sip of water when she was completely
awake and she tolerated well. Cont to monitoring for safe intake. No liquids
at bedside.

## 2021-02-19 NOTE — NUR
awake sitting up on side of bed ursula and wanting to know why she can't
eat, trying to explain and she continues to karely and jamie, at this nurse and
CNA, again, tried to explain to her, did obtain diet order, when she tried to
order she was then angry and cussing at dietary staff because she coldn't have
the food she wanted due to dietary restrictions, told me to stay out of her
business with explitives, left room

## 2021-02-19 NOTE — NUR
The patient is to discharge home today, 2/19 with [facility].
Services are PT/OT/Nursing and Wound care. Discharge orders faxed. There are
no additional needs.

## 2021-02-19 NOTE — NUR
remains sitting up on side of bed, medicated with xanax and percocet 1 tab pr
her request for anxiety and pain, full assessment completed, see interventions
for further info, up and about in room independently, has had no more
outbursts and is pleasant and cooperative at this time

## 2021-02-19 NOTE — NUR
remains sitting up on side of bed and talking on phone, asked about discharge
and she will plan to go between 1300 and 1330

## 2021-02-19 NOTE — NUR
remains awake and alert and sitting up on side of bed talking on the phone,
satevan percocet is starting to help

## 2021-02-19 NOTE — NUR
telemetry and INT discontinued, medicated with percocet 1 tab, discharge
instructions given and verbalizes understanding

## 2021-02-19 NOTE — NUR
Pt wakes and ask to eat but yet falls back asleep. Eva MILAN notifed of pt
being slightly more awake. Explained to pt she can not have anything by mouth
at this time due to her being minimally repsonsive, she states she does not
remember not being aware. Continue to monitor. If pt becomes awake and rn
feels she is safe to eat, may start clears.

## 2021-02-19 NOTE — NUR
entered room again and she is now quiet and apologizing, meds given and
breakfast served, c/os pain to right foot, toes on right foot on the bottom
are black in color, both feet have +3 edema, will let her eat breakfast and
will complete assessment later,

## 2021-02-19 NOTE — NUR
pt had episode of vomiting/dry heaving. Minimal clear liquid came up . Pt
cleaned up and postioned upright. Asking for food after dry heaving. Explained
to her it is not safe at this time as she is at risk to aspirate.

## 2021-02-22 NOTE — NUR
Spoke with , Chelly TUCKER, after talking with [place] by
phone.  She reports that home health did not come to see her, that she is not
taking her medications, and was resistent to calling her physician for follow
up sooner.  States her fiance is there with her to help her.  Strongly
encouraged her to call physician and talk with them about her situation.

## 2021-03-18 ENCOUNTER — HOSPITAL ENCOUNTER (EMERGENCY)
Dept: HOSPITAL 19 - COL.ER | Age: 46
Discharge: HOME | End: 2021-03-18
Attending: EMERGENCY MEDICINE
Payer: MEDICARE

## 2021-03-18 VITALS — SYSTOLIC BLOOD PRESSURE: 144 MMHG | HEART RATE: 78 BPM | DIASTOLIC BLOOD PRESSURE: 76 MMHG

## 2021-03-18 VITALS — BODY MASS INDEX: 36.41 KG/M2 | HEIGHT: 62.01 IN | WEIGHT: 200.4 LBS

## 2021-03-18 VITALS — TEMPERATURE: 97.7 F

## 2021-03-18 DIAGNOSIS — E11.40: ICD-10-CM

## 2021-03-18 DIAGNOSIS — J44.9: ICD-10-CM

## 2021-03-18 DIAGNOSIS — L03.031: ICD-10-CM

## 2021-03-18 DIAGNOSIS — Z79.02: ICD-10-CM

## 2021-03-18 DIAGNOSIS — F17.210: ICD-10-CM

## 2021-03-18 DIAGNOSIS — Z79.84: ICD-10-CM

## 2021-03-18 DIAGNOSIS — L60.0: ICD-10-CM

## 2021-03-18 DIAGNOSIS — L03.032: Primary | ICD-10-CM

## 2021-03-18 DIAGNOSIS — I25.2: ICD-10-CM

## 2021-03-18 DIAGNOSIS — Z95.9: ICD-10-CM

## 2021-03-18 DIAGNOSIS — Z79.82: ICD-10-CM

## 2021-03-18 LAB
ALBUMIN SERPL-MCNC: 3.8 GM/DL (ref 3.5–5)
ALP SERPL-CCNC: 85 U/L (ref 50–136)
ALT SERPL-CCNC: 10 U/L (ref 4–34)
ANION GAP SERPL CALC-SCNC: 9 MMOL/L (ref 7–16)
AST SERPL-CCNC: 17 U/L (ref 15–37)
BASOPHILS # BLD: 0 10*3/UL (ref 0–0.2)
BASOPHILS NFR BLD AUTO: 0.4 % (ref 0–2)
BILIRUB SERPL-MCNC: 1.4 MG/DL (ref 0–1)
BUN SERPL-MCNC: 18 MG/DL (ref 7–17)
CALCIUM SERPL-MCNC: 9.3 MG/DL (ref 8.4–10.2)
CHLORIDE SERPL-SCNC: 109 MMOL/L (ref 98–107)
CO2 SERPL-SCNC: 24 MMOL/L (ref 22–30)
CREAT SERPL-SCNC: 0.96 UMOL/L (ref 0.52–1.25)
CRP SERPL-MCNC: 1.1 MG/DL (ref 0–0.9)
EOSINOPHIL # BLD: 0.1 10*3/UL (ref 0–0.7)
EOSINOPHIL NFR BLD: 0.8 % (ref 0–4)
ERYTHROCYTE [DISTWIDTH] IN BLOOD BY AUTOMATED COUNT: 17.7 % (ref 11.5–14.5)
GLUCOSE SERPL-MCNC: 129 MG/DL (ref 74–106)
GRANULOCYTES # BLD AUTO: 70.9 % (ref 42.2–75.2)
HCT VFR BLD AUTO: 34.9 % (ref 37–47)
HGB BLD-MCNC: 11.2 G/DL (ref 12.5–16)
LYMPHOCYTES # BLD: 1.3 10*3/UL (ref 1.2–3.4)
LYMPHOCYTES NFR BLD: 17.9 % (ref 20–51)
MCH RBC QN AUTO: 27 PG (ref 27–31)
MCHC RBC AUTO-ENTMCNC: 32 G/DL (ref 33–37)
MCV RBC AUTO: 85 FL (ref 80–100)
MONOCYTES # BLD: 0.7 10*3/UL (ref 0.1–0.6)
MONOCYTES NFR BLD AUTO: 9.6 % (ref 1.7–9.3)
NEUTROPHILS # BLD: 5.3 10*3/UL (ref 1.4–6.5)
PLATELET # BLD AUTO: 292 K/MM3 (ref 130–400)
PMV BLD AUTO: 10.8 FL (ref 7.4–10.4)
POTASSIUM SERPL-SCNC: 3.4 MMOL/L (ref 3.4–5)
PROT SERPL-MCNC: 7.6 GM/DL (ref 6.4–8.2)
RBC # BLD AUTO: 4.09 M/MM3 (ref 4.1–5.3)
SODIUM SERPL-SCNC: 142 MMOL/L (ref 137–145)